# Patient Record
Sex: FEMALE | Race: WHITE | Employment: FULL TIME | URBAN - NONMETROPOLITAN AREA
[De-identification: names, ages, dates, MRNs, and addresses within clinical notes are randomized per-mention and may not be internally consistent; named-entity substitution may affect disease eponyms.]

---

## 2023-02-28 ENCOUNTER — APPOINTMENT (OUTPATIENT)
Dept: GENERAL RADIOLOGY | Age: 18
End: 2023-02-28
Payer: COMMERCIAL

## 2023-02-28 ENCOUNTER — HOSPITAL ENCOUNTER (EMERGENCY)
Age: 18
Discharge: HOME | End: 2023-02-28
Payer: COMMERCIAL

## 2023-02-28 VITALS
DIASTOLIC BLOOD PRESSURE: 60 MMHG | HEART RATE: 88 BPM | WEIGHT: 167.2 LBS | BODY MASS INDEX: 29.62 KG/M2 | TEMPERATURE: 97.8 F | RESPIRATION RATE: 16 BRPM | OXYGEN SATURATION: 98 % | SYSTOLIC BLOOD PRESSURE: 136 MMHG | HEIGHT: 63 IN

## 2023-02-28 DIAGNOSIS — S60.10XA SUBUNGUAL HEMATOMA OF DIGIT OF HAND, INITIAL ENCOUNTER: Primary | ICD-10-CM

## 2023-02-28 DIAGNOSIS — S67.191A CRUSHING INJURY OF LEFT INDEX FINGER, INITIAL ENCOUNTER: ICD-10-CM

## 2023-02-28 PROCEDURE — 73140 X-RAY EXAM OF FINGER(S): CPT

## 2023-02-28 PROCEDURE — 99203 OFFICE O/P NEW LOW 30 MIN: CPT

## 2023-02-28 PROCEDURE — 99203 OFFICE O/P NEW LOW 30 MIN: CPT | Performed by: NURSE PRACTITIONER

## 2023-02-28 ASSESSMENT — ENCOUNTER SYMPTOMS
CHEST TIGHTNESS: 0
NAUSEA: 0
VOMITING: 0
COUGH: 0
RHINORRHEA: 0
SORE THROAT: 0
DIARRHEA: 0
SHORTNESS OF BREATH: 0

## 2023-02-28 ASSESSMENT — PAIN DESCRIPTION - LOCATION: LOCATION: FINGER (COMMENT WHICH ONE)

## 2023-02-28 ASSESSMENT — PAIN - FUNCTIONAL ASSESSMENT: PAIN_FUNCTIONAL_ASSESSMENT: 0-10

## 2023-02-28 ASSESSMENT — PAIN SCALES - GENERAL: PAINLEVEL_OUTOF10: 7

## 2023-02-28 ASSESSMENT — PAIN DESCRIPTION - ORIENTATION: ORIENTATION: LEFT

## 2023-02-28 NOTE — ED TRIAGE NOTES
To room 4 c/o crush injury to left index finger  subungal hematoma noted  pt reports  \" A heavy equipment soriano dropped on it at work  [employer] tractor\"  Pt decline to file work [employer] comp

## 2023-02-28 NOTE — ED PROVIDER NOTES
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 HISTORY OF PRESENT ILLNESS   Regino Zarate is a 21 y.o. female who presents to the [address] urgent care for evaluation of a left index finger injury. She reports this occurred 2/24/2023. She reports that she and another coworker were lifting a soriano when it landed on her left index finger. She is noted to have a subungual hematoma. The history is provided by the patient. No  was used. REVIEW OF SYSTEMS     Review of Systems   Constitutional:  Negative for activity change, appetite change, chills, fatigue and fever. HENT:  Negative for ear discharge, ear pain, rhinorrhea and sore throat. Respiratory:  Negative for cough, chest tightness and shortness of breath. Cardiovascular:  Negative for chest pain. Gastrointestinal:  Negative for diarrhea, nausea and vomiting. Genitourinary:  Negative for dysuria. Musculoskeletal:  Positive for arthralgias. Skin:  Negative for rash. Allergic/Immunologic: Negative for environmental allergies and food allergies. Neurological:  Negative for dizziness and headaches. PAST MEDICAL HISTORY         Diagnosis Date    Migraine     Sweating abnormality        SURGICALHISTORY     Patient  has a past surgical history that includes Tonsillectomy and Adenoidectomy. CURRENT MEDICATIONS       There are no discharge medications for this patient. ALLERGIES     Patient is is allergic to pcn [penicillins] and zofran [ondansetron]. Patients   There is no immunization history on file for this patient. FAMILY HISTORY     Patient's family history is not on file. SOCIAL HISTORY     Patient  reports that she has never smoked.  She does not have any smokeless tobacco history on file. She reports that she does not currently use alcohol. She reports that she does not use drugs. PHYSICAL EXAM     ED TRIAGE VITALS  BP: 136/60, Temp: 97.8 °F (36.6 °C), Heart Rate: 88, Resp: 16, SpO2: 98 %,Estimated body mass index is 29.62 kg/m² as calculated from the following:    Height as of this encounter: 5' 3\" (1.6 m). Weight as of this encounter: 167 lb 3.2 oz (75.8 kg). ,Patient's last menstrual period was 02/21/2023. Physical Exam  Vitals and nursing note reviewed. Constitutional:       General: She is not in acute distress. Appearance: Normal appearance. She is not ill-appearing, toxic-appearing or diaphoretic. HENT:      Head: Normocephalic. Right Ear: Ear canal and external ear normal.      Left Ear: Ear canal and external ear normal.      Nose: Nose normal. No congestion or rhinorrhea. Mouth/Throat:      Mouth: Mucous membranes are moist.      Pharynx: Oropharynx is clear. No oropharyngeal exudate or posterior oropharyngeal erythema. Cardiovascular:      Rate and Rhythm: Normal rate. Pulses: Normal pulses. Pulmonary:      Effort: Pulmonary effort is normal. No respiratory distress. Breath sounds: No stridor. No wheezing or rhonchi. Abdominal:      General: Abdomen is flat. Bowel sounds are normal.      Palpations: Abdomen is soft. Musculoskeletal:         General: No swelling or tenderness. Normal range of motion. Cervical back: Normal range of motion. Skin:         Neurological:      General: No focal deficit present. Mental Status: She is alert and oriented to person, place, and time. Psychiatric:         Mood and Affect: Mood normal.         Behavior: Behavior normal.       DIAGNOSTIC RESULTS     Labs:No results found for this visit on 02/28/23.     IMAGING:    XR FINGER LEFT (MIN 2 VIEWS)   Final Result   Soft tissue swelling no fracture            **This report has been created using voice recognition software.  It may contain minor errors which are inherent in voice recognition technology.**      Final report electronically signed by Dr. Alex Ramirez on 2/28/2023 2:24 PM            EKG: None      URGENT CARE COURSE:     Vitals:    02/28/23 1338 02/28/23 1450   BP: (!) 143/59 136/60   Pulse: 98 88   Resp: 14 16   Temp: 97.8 °F (36.6 °C)    SpO2: 98% 98%   Weight: 167 lb 3.2 oz (75.8 kg)    Height: 5' 3\" (1.6 m)        Medications - No data to display         PROCEDURES:  None    FINAL IMPRESSION      1. Subungual hematoma of digit of hand, initial encounter    2. Crushing injury of left index finger, initial encounter          DISPOSITION/ PLAN     Patient seen and evaluated for a left index finger injury.  An x-ray was completed with no acute findings.  I did complete a hematoma release with a cautery pen.  Patient tolerated well.  She is instructed to follow-up with her PCP or the [facility] in 3 to 5 days and worsening symptoms.  She is instructed to use over-the-counter Tylenol and Motrin for pain or fever.  She is agreeable to the above plan and denies questions or concerns at this time.      PATIENT REFERRED TO:  No primary care provider on file.  No primary physician on file.      DISCHARGE MEDICATIONS:  There are no discharge medications for this patient.      There are no discharge medications for this patient.      There are no discharge medications for this patient.      OLIVE Ba - CNP    (Please note that portions of this note were completed with a voice recognition program. Efforts were made to edit the dictations but occasionally words are mis-transcribed.)           OLIVE Ba CNP  02/28/23 1528

## 2023-02-28 NOTE — ED NOTES
Pt employer calls and gives information.   Pt reports she has talked to boss and \" will file [employer] now\"     Leona Jolly, ALEC  02/28/23 9258

## 2024-05-06 ENCOUNTER — APPOINTMENT (OUTPATIENT)
Dept: GENERAL RADIOLOGY | Age: 19
End: 2024-05-06
Payer: COMMERCIAL

## 2024-05-06 ENCOUNTER — HOSPITAL ENCOUNTER (EMERGENCY)
Age: 19
Discharge: HOME | End: 2024-05-06
Payer: COMMERCIAL

## 2024-05-06 VITALS
RESPIRATION RATE: 20 BRPM | SYSTOLIC BLOOD PRESSURE: 124 MMHG | HEART RATE: 93 BPM | WEIGHT: 178.4 LBS | TEMPERATURE: 98.3 F | OXYGEN SATURATION: 98 % | DIASTOLIC BLOOD PRESSURE: 70 MMHG | BODY MASS INDEX: 31.6 KG/M2

## 2024-05-06 DIAGNOSIS — S93.492A SPRAIN OF ANTERIOR TALOFIBULAR LIGAMENT OF LEFT ANKLE, INITIAL ENCOUNTER: Primary | ICD-10-CM

## 2024-05-06 PROCEDURE — 99213 OFFICE O/P EST LOW 20 MIN: CPT

## 2024-05-06 PROCEDURE — 73610 X-RAY EXAM OF ANKLE: CPT

## 2024-05-06 RX ORDER — AMITRIPTYLINE HYDROCHLORIDE 50 MG/1
50 TABLET, FILM COATED ORAL NIGHTLY
COMMUNITY

## 2024-05-06 RX ORDER — NAPROXEN 500 MG/1
500 TABLET ORAL 2 TIMES DAILY WITH MEALS
COMMUNITY

## 2024-05-06 RX ORDER — KETOROLAC TROMETHAMINE 10 MG/1
10 TABLET, FILM COATED ORAL EVERY 6 HOURS PRN
Qty: 20 TABLET | Refills: 0 | Status: SHIPPED | OUTPATIENT
Start: 2024-05-06

## 2024-05-06 ASSESSMENT — PAIN - FUNCTIONAL ASSESSMENT
PAIN_FUNCTIONAL_ASSESSMENT: 0-10
PAIN_FUNCTIONAL_ASSESSMENT: PREVENTS OR INTERFERES SOME ACTIVE ACTIVITIES AND ADLS

## 2024-05-06 ASSESSMENT — PAIN DESCRIPTION - PAIN TYPE: TYPE: ACUTE PAIN

## 2024-05-06 ASSESSMENT — PAIN DESCRIPTION - DESCRIPTORS: DESCRIPTORS: ACHING

## 2024-05-06 ASSESSMENT — PAIN DESCRIPTION - LOCATION: LOCATION: ANKLE

## 2024-05-06 ASSESSMENT — PAIN DESCRIPTION - ONSET: ONSET: SUDDEN

## 2024-05-06 ASSESSMENT — PAIN SCALES - GENERAL: PAINLEVEL_OUTOF10: 7

## 2024-05-06 ASSESSMENT — PAIN DESCRIPTION - FREQUENCY: FREQUENCY: CONTINUOUS

## 2024-05-06 ASSESSMENT — PAIN DESCRIPTION - ORIENTATION: ORIENTATION: LEFT

## 2024-05-06 NOTE — DISCHARGE INSTRUCTIONS
Take Tylenol/Toradol in alternating fashion decrease inflammation/treat pain.  Toradol is an NSAID much like ibuprofen/Motrin/Aleve/naproxen.  Do not take these medications in addition to Toradol, you will have significant side effects.  Tylenol is not an NSAID.    Adhere to RICE precautions outlined in this discharge paperwork.    Follow-up with orthopedics at [facility] orthopedics walk-in clinic today or tomorrow depending on your availability.  They would be able to reevaluate edema if you need additional imaging or if you need work restrictions.    Please return to ER/urgent care if you have alterations in sensation or reinjure the area.    I hope you are feeling better soon!

## 2024-05-06 NOTE — ED PROVIDER NOTES
[facility] URGENT CARE      URGENT CARE     Pt Name: Julisa Joshua  MRN: [id number]  Birthdate 2005  Date of evaluation: 5/6/2024  Provider: OLIVE Rios CNP    Urgent Care Encounter     CHIEF COMPLAINT       Chief Complaint   Patient presents with    Ankle Pain     \"I sprained it when my kayak flipped up in [place] on Saturday.   Left ankle     HISTORY OF PRESENT ILLNESS   Julisa Joshua is a 18 y.o. female who presents to urgent care with chief complaint of left ankle injury/pain.  Abrupt onset on Saturdayshe was climbing up from her back getting back to her kayak when she unfortunately lost her footing and experienced inversion/supination patterns injury to left ankle.  Sudden onset of pain/bruising at the left front part of her ankle.  Admits to history of the symptoms where she had sprained ankle several times before, estimates approximately 3x/5 years.  Denies altered sensation or numbness.  Treating with Tylenol/ibuprofen which is helping with pain but \"it comes back after the meds wear off.\"Wearing ankle brace she had at home which she states is providing a little bit of support when she walks.  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  Denies fever chills or history of ankle fracture.  History obtained from patient  URGENT CARE TIMELINE      ED Course as of 05/06/24 1157   Mon May 06, 2024   1123 BP: 124/70  Vitals are stable.  No tachycardia indicative of uncontrolled acute pain. [JR]      ED Course User Index  [JR] Justyn Sanchez APRN - CNP     PAST MEDICAL HISTORY         Diagnosis Date    Migraine     Sweating abnormality      SURGICALHISTORY     Patient  has a past surgical history that includes Tonsillectomy and Adenoidectomy and cyst removal (Right).  CURRENT MEDICATIONS       Previous Medications    AMITRIPTYLINE (ELAVIL) 50 MG TABLET    Take 1 tablet by mouth nightly    NAPROXEN (NAPROSYN) 500 MG TABLET     Take 1 tablet by mouth 2 times daily (with meals)    NONFORMULARY    Birth Control      Mary     ALLERGIES     Patient is is allergic to pcn [penicillins] and zofran [ondansetron].  Patients   There is no immunization history on file for this patient.  FAMILY HISTORY     Patient's family history is not on file.  SOCIAL HISTORY     Patient  reports that she has never smoked. She has never used smokeless tobacco. She reports that she does not currently use alcohol. She reports that she does not use drugs.  PHYSICAL EXAM     ED TRIAGE VITALS  BP: 124/70, Temp: 98.3 °F (36.8 °C), Pulse: 93, Respirations: 20, SpO2: 98 %,Estimated body mass index is 31.6 kg/m² as calculated from the following:    Height as of 2/28/23: 1.6 m (5' 3\").    Weight as of this encounter: 80.9 kg (178 lb 6.4 oz).,Patient's last menstrual period was 04/16/2024 (approximate).  Physical Exam  Vitals and nursing note reviewed.   Constitutional:       General: She is not in acute distress.     Appearance: Normal appearance. She is not ill-appearing, toxic-appearing or diaphoretic.   Eyes:      Pupils: Pupils are equal, round, and reactive to light.   Cardiovascular:      Rate and Rhythm: Normal rate and regular rhythm.   Musculoskeletal:      Right ankle: Normal.      Right Achilles Tendon: Normal.      Left ankle: Swelling and ecchymosis present. No deformity or lacerations. Tenderness present over the ATF ligament. No lateral malleolus, medial malleolus, AITF ligament, CF ligament, posterior TF ligament, base of 5th metatarsal or proximal fibula tenderness. Normal range of motion. Anterior drawer test negative. Normal pulse.      Left Achilles Tendon: Normal. No tenderness or defects. Martinez's test negative.      Comments: Mild few swelling encompassing entire left ankle.  There is small diffuse ecchymotic area on the left anterior ankle measuring approximately 3 cm x 3 cm with correlating tenderness to palpation.  No crepitus/bony

## 2025-06-26 ENCOUNTER — HOSPITAL ENCOUNTER (EMERGENCY)
Age: 20
Discharge: HOME | End: 2025-06-26
Payer: COMMERCIAL

## 2025-06-26 VITALS
HEIGHT: 63 IN | SYSTOLIC BLOOD PRESSURE: 115 MMHG | HEART RATE: 106 BPM | WEIGHT: 202.8 LBS | BODY MASS INDEX: 35.93 KG/M2 | OXYGEN SATURATION: 97 % | TEMPERATURE: 97.9 F | RESPIRATION RATE: 16 BRPM | DIASTOLIC BLOOD PRESSURE: 73 MMHG

## 2025-06-26 DIAGNOSIS — T67.5XXA HEAT EXHAUSTION, INITIAL ENCOUNTER: ICD-10-CM

## 2025-06-26 DIAGNOSIS — R11.0 NAUSEA: Primary | ICD-10-CM

## 2025-06-26 PROCEDURE — 99213 OFFICE O/P EST LOW 20 MIN: CPT | Performed by: NURSE PRACTITIONER

## 2025-06-26 PROCEDURE — 99203 OFFICE O/P NEW LOW 30 MIN: CPT

## 2025-06-26 RX ORDER — METOCLOPRAMIDE 10 MG/1
10 TABLET ORAL 4 TIMES DAILY PRN
Qty: 20 TABLET | Refills: 0 | Status: SHIPPED | OUTPATIENT
Start: 2025-06-26

## 2025-06-26 ASSESSMENT — ENCOUNTER SYMPTOMS
SHORTNESS OF BREATH: 0
DIARRHEA: 0
CHEST TIGHTNESS: 0
SORE THROAT: 0
NAUSEA: 1
VOMITING: 0
RHINORRHEA: 0
COUGH: 0

## 2025-06-26 ASSESSMENT — PAIN - FUNCTIONAL ASSESSMENT: PAIN_FUNCTIONAL_ASSESSMENT: NONE - DENIES PAIN

## 2025-06-26 NOTE — ED PROVIDER NOTES
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  HISTORY OF PRESENT ILLNESS   Julisa Joshua is a 19 y.o. female who presents to the [facility] urgent care for evaluation of nausea and fatigue.  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  She reports that she is drinking plenty of fluids, but continues to feel nauseated and dizzy.  Denies syncopal episode.    The history is provided by the patient. No  was used.       REVIEW OF SYSTEMS     Review of Systems   Constitutional:  Positive for fatigue. Negative for activity change, appetite change, chills and fever.   HENT:  Negative for ear discharge, ear pain, rhinorrhea and sore throat.    Respiratory:  Negative for cough, chest tightness and shortness of breath.    Cardiovascular:  Negative for chest pain.   Gastrointestinal:  Positive for nausea. Negative for diarrhea and vomiting.   Genitourinary:  Negative for dysuria.   Skin:  Negative for rash.   Allergic/Immunologic: Negative for environmental allergies and food allergies.   Neurological:  Negative for dizziness and headaches.       PAST MEDICAL HISTORY         Diagnosis Date    Endometriosis     Migraine     Sweating abnormality        SURGICALHISTORY     Patient  has a past surgical history that includes Tonsillectomy and Adenoidectomy; cyst removal (Right); and tumor removal.    CURRENT MEDICATIONS       Discharge Medication List as of 6/26/2025  3:17 PM        CONTINUE these medications which have NOT CHANGED    Details   Probiotic Product (PROBIOTIC BLEND PO) Take by mouthHistorical Med      amitriptyline (ELAVIL) 50 MG tablet Take 1 tablet by mouth nightlyHistorical

## 2025-07-18 ENCOUNTER — HOSPITAL ENCOUNTER (EMERGENCY)
Age: 20
Discharge: HOME | End: 2025-07-18
Payer: COMMERCIAL

## 2025-07-18 VITALS
TEMPERATURE: 97.7 F | OXYGEN SATURATION: 98 % | DIASTOLIC BLOOD PRESSURE: 98 MMHG | HEIGHT: 63 IN | HEART RATE: 92 BPM | RESPIRATION RATE: 16 BRPM | WEIGHT: 199 LBS | SYSTOLIC BLOOD PRESSURE: 127 MMHG | BODY MASS INDEX: 35.26 KG/M2

## 2025-07-18 DIAGNOSIS — H66.91 RIGHT OTITIS MEDIA, UNSPECIFIED OTITIS MEDIA TYPE: Primary | ICD-10-CM

## 2025-07-18 DIAGNOSIS — J06.9 ACUTE UPPER RESPIRATORY INFECTION: ICD-10-CM

## 2025-07-18 LAB
S PYO AG THROAT QL: NEGATIVE
SARS-COV-2 RDRP RESP QL NAA+PROBE: NOT  DETECTED

## 2025-07-18 PROCEDURE — 87651 STREP A DNA AMP PROBE: CPT

## 2025-07-18 PROCEDURE — 99213 OFFICE O/P EST LOW 20 MIN: CPT

## 2025-07-18 PROCEDURE — 99213 OFFICE O/P EST LOW 20 MIN: CPT | Performed by: NURSE PRACTITIONER

## 2025-07-18 PROCEDURE — 87635 SARS-COV-2 COVID-19 AMP PRB: CPT

## 2025-07-18 RX ORDER — BENZONATATE 200 MG/1
200 CAPSULE ORAL 3 TIMES DAILY PRN
Qty: 15 CAPSULE | Refills: 0 | Status: SHIPPED | OUTPATIENT
Start: 2025-07-18 | End: 2025-07-23

## 2025-07-18 RX ORDER — SUMATRIPTAN SUCCINATE 25 MG/1
TABLET ORAL
COMMUNITY
Start: 2025-07-02

## 2025-07-18 RX ORDER — PREDNISONE 20 MG/1
40 TABLET ORAL DAILY
Qty: 10 TABLET | Refills: 0 | Status: SHIPPED | OUTPATIENT
Start: 2025-07-18 | End: 2025-07-23

## 2025-07-18 RX ORDER — CEFDINIR 300 MG/1
300 CAPSULE ORAL 2 TIMES DAILY
Qty: 20 CAPSULE | Refills: 0 | Status: SHIPPED | OUTPATIENT
Start: 2025-07-18 | End: 2025-07-28

## 2025-07-18 ASSESSMENT — ENCOUNTER SYMPTOMS
SINUS PRESSURE: 0
SINUS PAIN: 0
SHORTNESS OF BREATH: 0
VOMITING: 0
ABDOMINAL PAIN: 0
SORE THROAT: 1
COUGH: 1
NAUSEA: 0
CHEST TIGHTNESS: 1
DIARRHEA: 0

## 2025-07-18 ASSESSMENT — PAIN - FUNCTIONAL ASSESSMENT: PAIN_FUNCTIONAL_ASSESSMENT: NONE - DENIES PAIN

## 2025-07-18 NOTE — ED PROVIDER NOTES
[facility] URGENT CARE  UrgentCare Encounter      CHIEFCOMPLAINT       Chief Complaint   Patient presents with    Cough     fever       Nurses Notes reviewed and I agree except as noted in the HPI.  HISTORY OF PRESENT ILLNESS   Julisa Joshua is a 20 y.o. female who presents to care for complaint of cough, congestion, fatigue, and ear pain.  States symptoms started approximately 8 days ago.  Feels they are getting worse.  Denies fever, does complain of chills and bodyaches.  Denies vomiting or diarrhea.    REVIEW OF SYSTEMS     Review of Systems   Constitutional:  Positive for chills and fatigue. Negative for fever.   HENT:  Positive for congestion and sore throat. Negative for sinus pressure and sinus pain.    Respiratory:  Positive for cough and chest tightness. Negative for shortness of breath.    Cardiovascular:  Negative for chest pain.   Gastrointestinal:  Negative for abdominal pain, diarrhea, nausea and vomiting.   Musculoskeletal:  Positive for myalgias.   Skin:  Negative for rash.   Neurological:  Negative for dizziness and headaches.       PAST MEDICAL HISTORY         Diagnosis Date    Endometriosis     Migraine     Sweating abnormality        SURGICAL HISTORY     Patient  has a past surgical history that includes Tonsillectomy and Adenoidectomy; cyst removal (Right); and tumor removal.    CURRENT MEDICATIONS       Discharge Medication List as of 7/18/2025  3:56 PM        CONTINUE these medications which have NOT CHANGED    Details   Pseudoephedrine-APAP-DM (DAYQUIL PO) Take by mouthHistorical Med      SUMAtriptan (IMITREX) 25 MG tablet TAKE 1 TABLET BY MOUTH AS NEEDED FOR MIGRAINE. MAY TAKE 1 ADDITIONAL TABLET 2 HOURS LATER IF NEEDEDHistorical Med      Probiotic Product (PROBIOTIC BLEND PO) Take by mouthHistorical Med      metoclopramide (REGLAN) 10 MG tablet Take 1 tablet by mouth 4 times daily as needed (nausea), Disp-20 tablet, R-0Normal      amitriptyline (ELAVIL) 50 MG tablet Take 1 tablet  Value Ref Range    SARS-CoV-2, SAEID NOT  DETECTED NOT DETECTED       IMAGING:  No orders to display     URGENT CARE COURSE:         Medications - No data to display  PROCEDURES:  FINALIMPRESSION      1. Right otitis media, unspecified otitis media type    2. Acute upper respiratory infection        DISPOSITION/PLAN   DISPOSITION Decision To Discharge 07/18/2025 03:50:08 PM   DISPOSITION CONDITION Stable     Rapid strep screen today is negative, COVID test is negative as well.  Patient nontoxic in appearance, afebrile.  No respiratory distress noted, lungs are clear.  Congested cough noted, complaining of chest discomfort with cough.  Right TM slightly erythematous, patient on day 8 of symptoms.  Prescription sent for prednisone, benzonatate, and cefdinir.  Work note given.  May take Tylenol or ibuprofen at home as needed for pain or fever.  Follow-up with PCP as needed, or if symptoms are not improving.  Report to ED for new or severe symptoms.      PATIENT REFERRED TO:  Unique Banuelos APRN - CNP  [address] [address] [phone]      As needed, If symptoms worsen    DISCHARGE MEDICATIONS:  Discharge Medication List as of 7/18/2025  3:56 PM        START taking these medications    Details   benzonatate (TESSALON) 200 MG capsule Take 1 capsule by mouth 3 times daily as needed for Cough, Disp-15 capsule, R-0Normal      predniSONE (DELTASONE) 20 MG tablet Take 2 tablets by mouth daily for 5 days, Disp-10 tablet, R-0Normal      cefdinir (OMNICEF) 300 MG capsule Take 1 capsule by mouth 2 times daily for 10 days, Disp-20 capsule, R-0Normal           Discharge Medication List as of 7/18/2025  3:56 PM          OLIVE Kessler CNP, OLIVE Mitchell CNP  07/18/25 1543

## 2025-07-18 NOTE — DISCHARGE INSTRUCTIONS
Your rapid strep test, and COVID test today are negative.    Take medications as prescribed.  You may take Tylenol or ibuprofen at home as needed for pain or fever.    Follow up with primary care provider as needed.      Report to ED with new or severe symptoms.